# Patient Record
Sex: MALE | Race: OTHER | URBAN - METROPOLITAN AREA
[De-identification: names, ages, dates, MRNs, and addresses within clinical notes are randomized per-mention and may not be internally consistent; named-entity substitution may affect disease eponyms.]

---

## 2022-09-05 ENCOUNTER — EMERGENCY (EMERGENCY)
Facility: HOSPITAL | Age: 27
LOS: 0 days | Discharge: HOME | End: 2022-09-05
Attending: EMERGENCY MEDICINE
Payer: MEDICAID

## 2022-09-05 VITALS — HEIGHT: 67 IN | WEIGHT: 154.98 LBS

## 2022-09-05 VITALS
TEMPERATURE: 98 F | HEART RATE: 85 BPM | DIASTOLIC BLOOD PRESSURE: 74 MMHG | OXYGEN SATURATION: 100 % | SYSTOLIC BLOOD PRESSURE: 114 MMHG | RESPIRATION RATE: 16 BRPM

## 2022-09-05 DIAGNOSIS — R30.0 DYSURIA: ICD-10-CM

## 2022-09-05 DIAGNOSIS — R10.9 UNSPECIFIED ABDOMINAL PAIN: ICD-10-CM

## 2022-09-05 DIAGNOSIS — N48.89 OTHER SPECIFIED DISORDERS OF PENIS: ICD-10-CM

## 2022-09-05 LAB
APPEARANCE UR: CLEAR — SIGNIFICANT CHANGE UP
BILIRUB UR-MCNC: NEGATIVE — SIGNIFICANT CHANGE UP
COLOR SPEC: YELLOW — SIGNIFICANT CHANGE UP
DIFF PNL FLD: NEGATIVE — SIGNIFICANT CHANGE UP
GLUCOSE UR QL: NEGATIVE — SIGNIFICANT CHANGE UP
KETONES UR-MCNC: NEGATIVE — SIGNIFICANT CHANGE UP
LEUKOCYTE ESTERASE UR-ACNC: ABNORMAL
NITRITE UR-MCNC: NEGATIVE — SIGNIFICANT CHANGE UP
PH UR: 7 — SIGNIFICANT CHANGE UP (ref 5–8)
PROT UR-MCNC: NEGATIVE — SIGNIFICANT CHANGE UP
SP GR SPEC: 1.01 — SIGNIFICANT CHANGE UP (ref 1.01–1.02)
UROBILINOGEN FLD QL: NEGATIVE — SIGNIFICANT CHANGE UP

## 2022-09-05 PROCEDURE — 87591 N.GONORRHOEAE DNA AMP PROB: CPT

## 2022-09-05 PROCEDURE — 81001 URINALYSIS AUTO W/SCOPE: CPT

## 2022-09-05 PROCEDURE — 87491 CHLMYD TRACH DNA AMP PROBE: CPT

## 2022-09-05 PROCEDURE — 87086 URINE CULTURE/COLONY COUNT: CPT

## 2022-09-05 PROCEDURE — 99284 EMERGENCY DEPT VISIT MOD MDM: CPT

## 2022-09-05 PROCEDURE — 99283 EMERGENCY DEPT VISIT LOW MDM: CPT

## 2022-09-05 NOTE — ED ADULT TRIAGE NOTE - CHIEF COMPLAINT QUOTE
Pt. to the ED BIB Spouse C/P Lower Back Pain with Burning and itching on Urination  x 2 weeks - Pt. reports Concentrated Urine- Denies major medical hx

## 2022-09-05 NOTE — ED STATDOCS - PROGRESS NOTE DETAILS
Gina Capone PGY-3: UA with trace bacteria, leuk esterase, 3-5 WBC. Will await culture, treat if positive. DC with return precautions.

## 2022-09-05 NOTE — ED STATDOCS - NS ED ROS FT
Constitutional: nad, well appearing  HEENT:  no nasal congestion, eye drainage or ear pain.    CVS:  no cp  Resp:  No sob, no cough  GI:  no abdominal pain, no nausea or vomiting  :  +dysuria, +flank pain, +penile itching  MSK: no joint pain or limited ROM  Skin: no rash  Neuro: no change in mental status or level of consciousness  Heme/lymph: no bleeding

## 2022-09-05 NOTE — ED STATDOCS - NSFOLLOWUPINSTRUCTIONS_ED_ALL_ED_FT
Usted fue visto en el departamento de emergencias. Se adjuntan luis resultados.    Por favor, geronimo un seguimiento con cuevas médico de atención primaria. Asegúrese de mantenerse sarah hidratado. Poornima líquidos con electrolitos mercedes Gatorade o Vitamin Water. Evite los líquidos deshidratantes mercedes el café, el té y el alcohol.    Si necesita antibióticos para luis síntomas urinarios, alguien lo llamará y los enviará a cuevas farmacia.    Regrese al departamento de emergencias con cualquier síntoma nuevo o que empeore, incluidos, entre otros:  -Dolor severo en el costado  -Cesar en la orina o incapacidad para orinar  -Fiebres altas  -Vómitos  -Dolor o hinchazón testicular    You were seen in the emergency department. Your results are attached.    Please follow up with your primary care doctor. Be sure to stay well hydrated. Drink liquids with electrolytes like Gatorade or Vitamin Water. Avoid dehydrating liquids like coffee, tea and alcohol.    If you require antibiotics for your urinary symptoms, someone will call you and send them to your pharmacy.     Please return to the emergency department with any new or worsening symptoms, including but not limited to:  -Severe flank pain  -Blood in the urine or inability to urinate  -High fevers  -Vomiting  -Testicular pain or swelling        Disuria    Dysuria      La disuria es dolor o molestia nubia la micción. El dolor o la molestia se pueden sentir en la parte del cuerpo que transporta la orina fuera de la vejiga (uretra) o en el tejido que rodea los genitales. El dolor también se puede sentir en la nain de la marta, en la parte inferior del abdomen y de la nain lumbar.    Quizás tenga que orinar con frecuencia o la sensación repentina de tener que orinar (tenesmo vesical). La disuria puede afectar a los hombres, klely es más frecuente en las mujeres. La causa puede deberse a muchos problemas diferentes:  •Infección de las vías urinarias.      •Cálculos renales o en la vejiga.      •Ciertas infecciones de transmisión sexual (ITS), mercedes la clamidia.      •Deshidratación.      •Inflamación de los tejidos de la vagina.      •Uso de ciertos medicamentos.      •Uso de ciertos jabones o productos perfumados que provocan irritación.        Siga estas instrucciones en cuevas casa:    Medicamentos     •Use los medicamentos de venta guera y los recetados solamente mercedes se lo haya indicado el médico.      •Si le recetaron un antibiótico, tómelo mercedes se lo haya indicado el médico. No deje de tigist el antibiótico aunque comience a sentirse mejor.        Qué debe comer y beber      •Beber suficiente líquido mercedes para mantener la orina de color amarillo pálido.      •Evite las bebidas con cafeína, el té y el alcohol. Estas bebidas pueden irritar la vejiga y empeorar la disuria. En los hombres, el alcohol puede irritar la próstata.      Instrucciones generales     •Controle cuevas afección para detectar cualquier cambio.      •Orine con frecuencia. Evite retener la orina nubia largos períodos.      •Si es joseph, debe limpiarse de adelante hacia atrás después de orinar o defecar. Use cada trozo de papel higiénico nilesh bren vez.      •Vaciar la vejiga después de tener sexo.      •Cumpla con todas las visitas de seguimiento. Odem es importante.      •Si le realizaron pruebas para detectar la causa de la disuria, es cuevas responsabilidad retirar los resultados de las pruebas. Consulte al médico o pregunte en el departamento donde se realiza la prueba cuándo estarán listos los resultados.        Comuníquese con un médico si:    •Tiene fiebre.      •Siente dolor en la espalda o a los costados del cuerpo.      •Tiene náuseas o vómitos.      •Observa cesar en la orina.      •Está orinando con más frecuencia que lo habitual.        Solicite ayuda de inmediato si:    •El dolor es intenso y no se valerie con los medicamentos.      •No puede comer ni beber sin vomitar.      •Se siente confundido.      •Tiene el latido cardíaco acelerado en reposo.      •Tiene temblores o escalofríos.      •Se siente muy débil.        Resumen    •La disuria es dolor o molestia al orinar. Existen muchas afecciones que pueden causar disuria.      •Si tiene disuria, es posible que tenga que orinar con frecuencia o tenga la sensación repentina de tener que orinar (tenesmo vesical).      •Controle cuevas afección para detectar cualquier cambio. Cumpla con todas las visitas de seguimiento.      •Asegúrese de orinar con frecuencia y beber suficiente líquido para mantener la orina de color amarillo pálido.      Esta información no tiene mercedes fin reemplazar el consejo del médico. Asegúrese de hacerle al médico cualquier pregunta que tenga.

## 2022-09-05 NOTE — ED STATDOCS - OBJECTIVE STATEMENT
27 year old male presents to the ED c/o dysuria and flank pain x 1 week. Also notes some penile itching. Denies fevers, chills, penile discharge. Denies prior occurrence. Pt does not have a PCP. Pt states that he gets similar symptoms when he does not drink a lot of water. Denies concern for STI or STD. Denies infidelity. No other injuries or complaints.    ID: [id number]

## 2022-09-05 NOTE — ED STATDOCS - PHYSICAL EXAMINATION
Constitutional: NAD, well appearing  HEENT: no rhinorrhea, PERRL, no oropharyngeal erythema or exudates, midline uvula.  TMs clear.  CVS:  RRR, no m/r/g  Resp:  CTAB  GI: soft, ntnd  MSK:  no restriction to rom, full ROM to all extremities  : clearish discharge at urethral meatus, no testicular TTP no CVAT. is uncircumcised  Neuro:  A&Ox3, 5/5 strength to all extremities,  SILT to all extremities  Skin: no rash  psych: clear thought content  Heme/lymph:  No LAD

## 2022-09-05 NOTE — ED STATDOCS - PATIENT PORTAL LINK FT
You can access the FollowMyHealth Patient Portal offered by [facility] by registering at the following website: [email or web address]. By joining [facility]’s FollowMyHealth portal, you will also be able to view your health information using other applications (apps) compatible with our system.

## 2022-09-05 NOTE — ED STATDOCS - CLINICAL SUMMARY MEDICAL DECISION MAKING FREE TEXT BOX
GC chlamydia, UA, U culture. do not luis pylo. otherwise well appearing. Dispo pending UA and reassessment. GC chlamydia, UA, U culture. do not luis pyelo. otherwise well appearing.  AVSS.  Dispo pending UA and reassessment. WIll check GC chlamydia, although patient denies any sexual relations outside of monogamous relationship.  Is not suspicious for STD.  Will hold off on presumptive treatment for now.  Will check,UA, U culture. do not luis pyelo. otherwise well appearing.  AVSS.  Dispo pending UA and reassessment.

## 2022-09-06 LAB
C TRACH RRNA SPEC QL NAA+PROBE: DETECTED
CULTURE RESULTS: SIGNIFICANT CHANGE UP
N GONORRHOEA RRNA SPEC QL NAA+PROBE: SIGNIFICANT CHANGE UP
SPECIMEN SOURCE: SIGNIFICANT CHANGE UP
SPECIMEN SOURCE: SIGNIFICANT CHANGE UP

## 2022-09-07 NOTE — ED POST DISCHARGE NOTE - REASON FOR FOLLOW-UP
+chlamydia, called number on file, pt's wife jenna he is not home, gave her call back number asked her to have pt called the ED for results Eloina Parr PA-C Other

## 2022-09-07 NOTE — ED POST DISCHARGE NOTE - DETAILS
discussed results with pt using  [id number], rx for doxy sent, advised pt that all sexual partners must also be treated, explained risks of reinfection Eloina Parr PA-C

## 2022-10-17 NOTE — ED ADULT NURSE NOTE - CCCP TRG CHIEF CMPLNT
[facility] [address] [place] [address]  Dr. Maura Cruz      urinary symptoms